# Patient Record
Sex: FEMALE | Race: WHITE | NOT HISPANIC OR LATINO
[De-identification: names, ages, dates, MRNs, and addresses within clinical notes are randomized per-mention and may not be internally consistent; named-entity substitution may affect disease eponyms.]

---

## 2024-03-25 ENCOUNTER — APPOINTMENT (OUTPATIENT)
Dept: HEART AND VASCULAR | Facility: CLINIC | Age: 74
End: 2024-03-25
Payer: MEDICARE

## 2024-03-25 VITALS
DIASTOLIC BLOOD PRESSURE: 71 MMHG | HEART RATE: 68 BPM | BODY MASS INDEX: 21.62 KG/M2 | HEIGHT: 63 IN | OXYGEN SATURATION: 96 % | TEMPERATURE: 97.7 F | WEIGHT: 122 LBS | SYSTOLIC BLOOD PRESSURE: 117 MMHG

## 2024-03-25 DIAGNOSIS — Z78.9 OTHER SPECIFIED HEALTH STATUS: ICD-10-CM

## 2024-03-25 DIAGNOSIS — E78.5 HYPERLIPIDEMIA, UNSPECIFIED: ICD-10-CM

## 2024-03-25 DIAGNOSIS — Z86.39 PERSONAL HISTORY OF OTHER ENDOCRINE, NUTRITIONAL AND METABOLIC DISEASE: ICD-10-CM

## 2024-03-25 DIAGNOSIS — R06.09 OTHER FORMS OF DYSPNEA: ICD-10-CM

## 2024-03-25 DIAGNOSIS — I34.0 NONRHEUMATIC MITRAL (VALVE) INSUFFICIENCY: ICD-10-CM

## 2024-03-25 PROBLEM — Z00.00 ENCOUNTER FOR PREVENTIVE HEALTH EXAMINATION: Status: ACTIVE | Noted: 2024-03-25

## 2024-03-25 PROCEDURE — 93000 ELECTROCARDIOGRAM COMPLETE: CPT

## 2024-03-25 PROCEDURE — 99204 OFFICE O/P NEW MOD 45 MIN: CPT

## 2024-03-25 PROCEDURE — 93306 TTE W/DOPPLER COMPLETE: CPT

## 2024-03-25 RX ORDER — ROSUVASTATIN CALCIUM 5 MG/1
5 TABLET, FILM COATED ORAL
Refills: 0 | Status: ACTIVE | COMMUNITY

## 2024-03-25 RX ORDER — LEVOTHYROXINE SODIUM 88 UG/1
88 CAPSULE ORAL
Refills: 0 | Status: ACTIVE | COMMUNITY

## 2024-03-25 NOTE — PHYSICAL EXAM
[Well Developed] : well developed [Well Nourished] : well nourished [No Acute Distress] : no acute distress [No Carotid Bruit] : no carotid bruit [Normal S1, S2] : normal S1, S2 [No Murmur] : no murmur [Clear Lung Fields] : clear lung fields [Good Air Entry] : good air entry [No Edema] : no edema [No Respiratory Distress] : no respiratory distress  [Moves all extremities] : moves all extremities [Normal Speech] : normal speech [Alert and Oriented] : alert and oriented [Normal Conjunctiva] : normal conjunctiva [Normal Venous Pressure] : normal venous pressure [Soft] : abdomen soft [Normal Gait] : normal gait [Non Tender] : non-tender [No Rash] : no rash

## 2024-03-25 NOTE — DISCUSSION/SUMMARY
[FreeTextEntry1] : 73 year old female with PMHX of HLD and Thyroid Disease with FMHX of CAD ( Father MI's with second around early 64 yo) here to establish cardiac care.   BAEZ: EKG NSR 62 bpm.  BAEZ is chronic but lately worse will get an echocardiogram to assess structure and evaluate LV fx and get CCTA to r/o CCTA continue cerstor and check labs fro HLD.  HLD: Will get copy from PCP. LDL goal < 100.

## 2024-03-25 NOTE — REVIEW OF SYSTEMS
[Dyspnea on exertion] : dyspnea during exertion [Palpitations] : palpitations [Fever] : no fever [SOB] : no shortness of breath [Chest Discomfort] : no chest discomfort [Lower Ext Edema] : no extremity edema [Orthopnea] : no orthopnea [PND] : no PND [Syncope] : no syncope [Cough] : no cough [Nausea] : no nausea [Vomiting] : no vomiting [Diarrhea] : diarrhea [Dizziness] : no dizziness [Numbness (Hypoesthesia)] : no numbness [Weakness] : no weakness [Speech Disturbance] : no speech disturbance [Easy Bleeding] : no tendency for easy bleeding

## 2024-03-25 NOTE — HISTORY OF PRESENT ILLNESS
[FreeTextEntry1] : 73 year old female with PMHX of HLD and Thyroid Disease with FMHX of CAD ( Father MI's with second around early 66 yo) here to establish cardiac care.   Patient is reporting gradually worsening dyspnea on exertion. She currently is reporting dyspnea at one flight but she pushes through. She has no associated chest pains. He has no edema, orthopnea, pnd. She has no bleeding complications.   She has no chest pains.   She occasionally has fluttering in her chest. This is short lived with no associated symptoms. This does not occur not even once a month. She denies any palpitations, shortness of breath at rest, dizziness, falls, syncope or neuro focal deficits.No PND/orthopnea  She has a low fat diet.   She has recent blood work with her endocrinologist 01/2024 and was recently restarted on Crestor 5mg.

## 2024-05-31 ENCOUNTER — RESULT REVIEW (OUTPATIENT)
Age: 74
End: 2024-05-31

## 2024-06-24 ENCOUNTER — NON-APPOINTMENT (OUTPATIENT)
Age: 74
End: 2024-06-24

## 2024-06-25 ENCOUNTER — APPOINTMENT (OUTPATIENT)
Dept: PULMONOLOGY | Facility: CLINIC | Age: 74
End: 2024-06-25
Payer: MEDICARE

## 2024-06-25 VITALS
OXYGEN SATURATION: 97 % | DIASTOLIC BLOOD PRESSURE: 71 MMHG | HEART RATE: 73 BPM | TEMPERATURE: 97.9 F | HEIGHT: 63 IN | BODY MASS INDEX: 21.26 KG/M2 | SYSTOLIC BLOOD PRESSURE: 119 MMHG | WEIGHT: 120 LBS

## 2024-06-25 PROCEDURE — ZZZZZ: CPT

## 2024-06-25 PROCEDURE — 94729 DIFFUSING CAPACITY: CPT

## 2024-06-25 PROCEDURE — 99203 OFFICE O/P NEW LOW 30 MIN: CPT | Mod: 25

## 2024-06-25 PROCEDURE — 94727 GAS DIL/WSHOT DETER LNG VOL: CPT

## 2024-06-25 PROCEDURE — 94060 EVALUATION OF WHEEZING: CPT
